# Patient Record
Sex: MALE | Employment: OTHER
[De-identification: names, ages, dates, MRNs, and addresses within clinical notes are randomized per-mention and may not be internally consistent; named-entity substitution may affect disease eponyms.]

---

## 2022-07-30 ENCOUNTER — TELEPHONE ENCOUNTER (OUTPATIENT)
Age: 82
End: 2022-07-30

## 2022-07-31 ENCOUNTER — TELEPHONE ENCOUNTER (OUTPATIENT)
Age: 82
End: 2022-07-31

## 2022-07-31 RX ORDER — OMEPRAZOLE 20 MG/1
CAPSULE, DELAYED RELEASE ORAL DAILY
Qty: 0 | Refills: 16 | Status: ACTIVE | COMMUNITY

## 2022-07-31 RX ORDER — OMEPRAZOLE 20 MG/1
1 CAPSULE, DELAYED RELEASE ORAL DAILY
Qty: 0 | Refills: 16 | Status: ACTIVE | COMMUNITY
Start: 2011-11-21

## 2023-11-02 ENCOUNTER — OFFICE VISIT (OUTPATIENT)
Dept: URBAN - METROPOLITAN AREA CLINIC 7 | Facility: CLINIC | Age: 83
End: 2023-11-02
Payer: MEDICARE

## 2023-11-02 ENCOUNTER — DASHBOARD ENCOUNTERS (OUTPATIENT)
Age: 83
End: 2023-11-02

## 2023-11-02 VITALS
HEIGHT: 68 IN | WEIGHT: 200.8 LBS | TEMPERATURE: 97.8 F | DIASTOLIC BLOOD PRESSURE: 79 MMHG | SYSTOLIC BLOOD PRESSURE: 135 MMHG | BODY MASS INDEX: 30.43 KG/M2

## 2023-11-02 DIAGNOSIS — Z86.010 HISTORY OF COLON POLYPS: ICD-10-CM

## 2023-11-02 DIAGNOSIS — K21.9 GASTROESOPHAGEAL REFLUX DISEASE, UNSPECIFIED WHETHER ESOPHAGITIS PRESENT: ICD-10-CM

## 2023-11-02 DIAGNOSIS — K86.2 PANCREATIC CYST: ICD-10-CM

## 2023-11-02 PROCEDURE — 99204 OFFICE O/P NEW MOD 45 MIN: CPT | Performed by: STUDENT IN AN ORGANIZED HEALTH CARE EDUCATION/TRAINING PROGRAM

## 2023-11-02 RX ORDER — OMEPRAZOLE 20 MG/1
1 CAPSULE, DELAYED RELEASE ORAL DAILY
Qty: 0 | Refills: 16 | COMMUNITY
Start: 2011-11-21

## 2023-11-02 RX ORDER — SIMVASTATIN 80 MG
AS DIRECTED TABLET ORAL
Status: ACTIVE | COMMUNITY

## 2023-11-02 RX ORDER — OMEPRAZOLE 20 MG/1
1 CAPSULE 30 MINUTES BEFORE A MEAL CAPSULE, DELAYED RELEASE ORAL ONCE A DAY
Qty: 90 | Refills: 3 | OUTPATIENT
Start: 2011-11-21

## 2023-11-02 RX ORDER — GLUCOSAMINE SULFATE 500 MG
1 CAPSULE WITH A MEAL CAPSULE ORAL THREE TIMES A DAY
Status: ACTIVE | COMMUNITY

## 2023-11-02 RX ORDER — IRON 18 MG
AS DIRECTED TABLET ORAL
Status: ACTIVE | COMMUNITY

## 2023-11-02 RX ORDER — LISINOPRIL 5 MG/1
1 TABLET TABLET ORAL ONCE A DAY
Status: ACTIVE | COMMUNITY

## 2023-11-02 NOTE — HPI-TODAY'S VISIT:
Patient has a history of TIA on 8/22/23 on plavix, kidney stones, BPH, DM, HLD,  who presents for panreatic cysts  Cardiology: Dr. Nagel- on loop recorder.  GI Hx: 11/2/23- Asymptomatic.  ROS includes and is negative for the below, unless specified positive above: Denies weight loss, fever, chills, abdominal pain, nausea, vomiting, diarrhea.  Denies dysphagia, reflux, UGI symptoms. Denies hematemesis, melena, hematochezia, blood per rectum.  Family hx: No GI cancers or IBD  EGD: 2009- Dr. Donis- gastritis (bx; mild chronic inlammation, neg HP), small HH. Rec GES.  Colonoscopy: 2015- Dr. Donis- one 8mm polyp in sigmoid colon (s: TA), sigmoid/desc divertic, IH. Repeat 5 years.  Imaging/Studies/Procedures: CT A/P 8/23/23- Mild inflammation surround desc colon may be colitis. A few distended small bowel loops may be ileus. Thickening of proximal jejunum may be enteritis. Bilateral kidney stones (L- 1cm, L hypodense lesions and complex lesion 1.5cm), bladder filled with hyperdense fluid/blood products. Multiple bladder stones (0.5cm), Stone near R UVJ (0.4cm).  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 Numerus kidney cysts without malignant features, f/u 1 year.

## 2023-11-06 PROBLEM — 428283002: Status: ACTIVE | Noted: 2023-11-06

## 2024-09-01 ENCOUNTER — LAB OUTSIDE AN ENCOUNTER (OUTPATIENT)
Dept: URBAN - METROPOLITAN AREA CLINIC 7 | Facility: CLINIC | Age: 84
End: 2024-09-01